# Patient Record
Sex: MALE | Race: WHITE | NOT HISPANIC OR LATINO | URBAN - METROPOLITAN AREA
[De-identification: names, ages, dates, MRNs, and addresses within clinical notes are randomized per-mention and may not be internally consistent; named-entity substitution may affect disease eponyms.]

---

## 2022-01-29 PROBLEM — M54.9 BACK PAIN WITH SCIATICA: Status: ACTIVE | Noted: 2022-01-29

## 2022-01-29 PROBLEM — M54.30 BACK PAIN WITH SCIATICA: Status: ACTIVE | Noted: 2022-01-29

## 2022-05-09 PROBLEM — M48.061 SPINAL STENOSIS OF LUMBAR REGION WITH RADICULOPATHY: Status: ACTIVE | Noted: 2022-05-09

## 2022-05-09 PROBLEM — M54.16 SPINAL STENOSIS OF LUMBAR REGION WITH RADICULOPATHY: Status: ACTIVE | Noted: 2022-05-09

## 2023-02-26 ENCOUNTER — OFFICE VISIT (OUTPATIENT)
Dept: URGENT CARE | Facility: CLINIC | Age: 28
End: 2023-02-26
Payer: COMMERCIAL

## 2023-02-26 VITALS
WEIGHT: 150 LBS | DIASTOLIC BLOOD PRESSURE: 60 MMHG | RESPIRATION RATE: 16 BRPM | HEART RATE: 72 BPM | BODY MASS INDEX: 23.54 KG/M2 | OXYGEN SATURATION: 96 % | SYSTOLIC BLOOD PRESSURE: 114 MMHG | TEMPERATURE: 98.5 F | HEIGHT: 67 IN

## 2023-02-26 DIAGNOSIS — T14.8XXA INFECTED WOUND: ICD-10-CM

## 2023-02-26 DIAGNOSIS — L08.9 INFECTED WOUND: ICD-10-CM

## 2023-02-26 DIAGNOSIS — L97.522 ULCER OF LEFT FOOT WITH FAT LAYER EXPOSED (HCC): ICD-10-CM

## 2023-02-26 PROCEDURE — 99203 OFFICE O/P NEW LOW 30 MIN: CPT

## 2023-02-26 RX ORDER — DOXYCYCLINE HYCLATE 100 MG
100 TABLET ORAL 2 TIMES DAILY
Qty: 10 TABLET | Refills: 0 | Status: SHIPPED | OUTPATIENT
Start: 2023-02-26 | End: 2023-03-03

## 2023-02-26 ASSESSMENT — ENCOUNTER SYMPTOMS
NUMBNESS: 0
CHILLS: 0
FEVER: 0

## 2023-02-26 ASSESSMENT — FIBROSIS 4 INDEX: FIB4 SCORE: 0.56

## 2023-02-26 NOTE — PROGRESS NOTES
Subjective     Robert Arana is a 28 y.o. male who presents with Blister (On left foot, worsened the last two weeks )            Blister  The current episode started more than 1 month ago. The problem occurs constantly. The problem has been gradually worsening. Pertinent negatives include no chills, fever or numbness. The symptoms are aggravated by walking. Treatments tried: hyrdrocolloid dressing; antibiotic ointment. The treatment provided mild relief.     Patient presents with wound on the left foot that started as erythema and redness which eventually progressed as a blister.  He attributed this to wearing poor fitting ski boots.  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  This eventually progressed with worsening symptoms.  He reports 2 days prior the area started hurting and expressing puslike materials.  He denies any fever or chills.  He reports that he has been applying antibiotic ointment and hydrocolloid dressing to the area which did not seem to provide any relief.  Patient denies any history of diabetes.    Patient's current problem list, medications, and past medical/surgical history were reviewed in Epic.    PMH:  has no past medical history on file.  MEDS: No current outpatient medications on file.  ALLERGIES:   Allergies   Allergen Reactions    Cefprozil Hives     Rxn in childhood  Reaction as a child      Sulfa Drugs      SURGHX:   Past Surgical History:   Procedure Laterality Date    PB LAMINOTOMY,LUMBAR DISK,1 INTRSP Right 6/7/2022    Procedure: RIGHT L4-5 MISCRODISCECTOMY AND CYST EXCISION;  Surgeon: Marc Byrd M.D.;  Location: [facility];  Service: Orthopedics     SOCHX:  reports that he has never smoked. He has never used smokeless tobacco.  FH: Reviewed with patient, not pertinent to this visit.       Review of Systems   Constitutional:  Negative for chills and fever.   Neurological:  Negative for numbness.            Objective     BP  "114/60 (BP Location: Left arm, Patient Position: Sitting, BP Cuff Size: Adult)   Pulse 72   Temp 36.9 °C (98.5 °F) (Temporal)   Resp 16   Ht 1.702 m (5' 7\")   Wt 68 kg (150 lb)   SpO2 96%   BMI 23.49 kg/m²      Physical Exam  Constitutional:       Appearance: Normal appearance.   HENT:      Head: Normocephalic.      Nose: Nose normal.   Eyes:      Extraocular Movements: Extraocular movements intact.   Cardiovascular:      Rate and Rhythm: Normal rate.      Pulses: Normal pulses.   Pulmonary:      Effort: Pulmonary effort is normal.   Musculoskeletal:         General: Normal range of motion.      Cervical back: Normal range of motion.   Skin:     General: Skin is warm.      Findings: Wound present.             Comments: Stage 2 pressure ulcer on lateral side of left foot; surrounding erythema and tenderness   Neurological:      General: No focal deficit present.      Mental Status: He is alert.   Psychiatric:         Mood and Affect: Mood normal.         Behavior: Behavior normal.            Assessment & Plan        1. Ulcer of left foot with fat layer exposed (HCC)    - Referral to Wound Clinic    2. Infected wound    - doxycycline (VIBRAMYCIN) 100 MG Tab; Take 1 Tablet by mouth 2 times a day for 5 days.  Dispense: 10 Tablet; Refill: 0  - Referral to Wound Clinic      Patient's presentation is consistent with ulcer on the left foot which is most likely a stage II pressure ulcer.  This appears to be infected.  He is prescribed doxycycline twice daily for 5 days.  Advised patient to keep the area clean and dry.  He is referred to the wound clinic for further evaluation and management.  Educated on signs and symptoms of infection to watch out for, when to return to clinic or go to the ER. Discussed treatment plan with patient, she is agreeable and verbalized understanding.  Educated patient on signs and symptoms watch out for, when to return to the clinic or go to the ER.    Electronically Signed by Karen" Petra Santamaria, APRN